# Patient Record
Sex: MALE | Race: WHITE | Employment: FULL TIME | URBAN - METROPOLITAN AREA
[De-identification: names, ages, dates, MRNs, and addresses within clinical notes are randomized per-mention and may not be internally consistent; named-entity substitution may affect disease eponyms.]

---

## 2018-02-11 ENCOUNTER — HOSPITAL ENCOUNTER (EMERGENCY)
Age: 53
Discharge: HOME | End: 2018-02-11
Attending: EMERGENCY MEDICINE | Admitting: EMERGENCY MEDICINE
Payer: COMMERCIAL

## 2018-02-11 VITALS
HEART RATE: 94 BPM | TEMPERATURE: 98.8 F | HEIGHT: 70 IN | WEIGHT: 165 LBS | RESPIRATION RATE: 12 BRPM | OXYGEN SATURATION: 100 % | DIASTOLIC BLOOD PRESSURE: 87 MMHG | SYSTOLIC BLOOD PRESSURE: 141 MMHG | BODY MASS INDEX: 23.62 KG/M2

## 2018-02-11 DIAGNOSIS — N34.2 URETHRITIS: Primary | ICD-10-CM

## 2018-02-11 PROCEDURE — 96372 THER/PROPH/DIAG INJ SC/IM: CPT

## 2018-02-11 PROCEDURE — 87491 CHLMYD TRACH DNA AMP PROBE: CPT | Performed by: EMERGENCY MEDICINE

## 2018-02-11 PROCEDURE — 74011000250 HC RX REV CODE- 250

## 2018-02-11 PROCEDURE — 74011250636 HC RX REV CODE- 250/636: Performed by: EMERGENCY MEDICINE

## 2018-02-11 PROCEDURE — 74011250637 HC RX REV CODE- 250/637: Performed by: EMERGENCY MEDICINE

## 2018-02-11 PROCEDURE — 99283 EMERGENCY DEPT VISIT LOW MDM: CPT

## 2018-02-11 RX ORDER — LIDOCAINE HYDROCHLORIDE 10 MG/ML
INJECTION, SOLUTION EPIDURAL; INFILTRATION; INTRACAUDAL; PERINEURAL
Status: COMPLETED
Start: 2018-02-11 | End: 2018-02-11

## 2018-02-11 RX ORDER — CEFTRIAXONE 250 MG/8ML
250 INJECTION, POWDER, FOR SOLUTION INTRAMUSCULAR; INTRAVENOUS
Status: COMPLETED | OUTPATIENT
Start: 2018-02-11 | End: 2018-02-11

## 2018-02-11 RX ORDER — AZITHROMYCIN 250 MG/1
1000 TABLET, FILM COATED ORAL
Status: COMPLETED | OUTPATIENT
Start: 2018-02-11 | End: 2018-02-11

## 2018-02-11 RX ADMIN — AZITHROMYCIN 1000 MG: 250 TABLET, FILM COATED ORAL at 10:07

## 2018-02-11 RX ADMIN — CEFTRIAXONE SODIUM 250 MG: 250 INJECTION, POWDER, FOR SOLUTION INTRAMUSCULAR; INTRAVENOUS at 10:09

## 2018-02-11 RX ADMIN — LIDOCAINE HYDROCHLORIDE 5 ML: 10 INJECTION, SOLUTION EPIDURAL; INFILTRATION; INTRACAUDAL; PERINEURAL at 10:09

## 2018-02-11 NOTE — ED TRIAGE NOTES
Reports pain w urination x4 days. Denies n/v/d. Pt worried about STD. Reports hx UTI. Denies rashes.

## 2018-02-11 NOTE — PROGRESS NOTES
I have reviewed discharge instructions with the patient. The patient verbalized understanding.   Pt ambulatory from ED in NAD

## 2018-02-11 NOTE — ED PROVIDER NOTES
EMERGENCY DEPARTMENT HISTORY AND PHYSICAL EXAM    9:46 AM      Date: 2/11/2018  Patient Name: Dom Talbot    History of Presenting Illness     Chief Complaint   Patient presents with    Urinary Pain         History Provided By: Patient    Chief Complaint: Urinary Pain  Duration: 4 Days  Timing:  Constant  Location:   Quality:   Severity: Moderate  Modifying Factors: Possible STD exposure  Associated Symptoms: denies penile lesions      Additional History (Context): Dom Talbot is a 45 y.o. male with hx of MASA syndrome and hernia repair who presents with c/o constant moderate urinary pain onset 4 days. Pt reports possible STD exposure. Denies penile lesions. PCP: None    Current Facility-Administered Medications   Medication Dose Route Frequency Provider Last Rate Last Dose    cefTRIAXone (ROCEPHIN) injection 250 mg  250 mg IntraMUSCular NOW Betzy Aguila MD        [facility]) tablet 1,000 mg  1,000 mg Oral NOW Betzy Aguila MD         Current Outpatient Prescriptions   Medication Sig Dispense Refill    doxycycline (VIBRAMYCIN) 100 mg capsule       tamsulosin (FLOMAX) 0.4 mg capsule Take 1 Cap by mouth daily (after dinner). 90 Cap 3    finasteride (PROSCAR) 5 mg tablet Take 1 Tab by mouth daily. 80 Tab 3       Past History     Past Medical History:  Past Medical History:   Diagnosis Date    MASA syndrome ([address]        Past Surgical History:  Past Surgical History:   Procedure Laterality Date    HX HERNIA REPAIR         Family History:  History reviewed. No pertinent family history. Social History:  Social History   Substance Use Topics    Smoking status: Current Every Day Smoker    Smokeless tobacco: Never Used      Comment: quit    Alcohol use Yes       Allergies: Allergies   Allergen Reactions    Penicillins Other (comments)         Review of Systems       Review of Systems   Constitutional: Negative. Negative for fever. HENT: Negative. Eyes: Negative. Respiratory: Negative. Cardiovascular: Negative. Gastrointestinal: Negative. Endocrine: Negative. Genitourinary: Positive for dysuria. Negative for genital sores. Musculoskeletal: Negative. Skin: Negative. Allergic/Immunologic: Negative. Neurological: Negative. Hematological: Negative. Psychiatric/Behavioral: Negative. All other systems reviewed and are negative. Physical Exam     Visit Vitals    /87 (BP Patient Position: At rest)    Pulse 94    Temp 98.8 °F (37.1 °C)    Resp 12    Ht 5' 10\" (1.778 m)    Wt 74.8 kg (165 lb)    SpO2 100%    BMI 23.68 kg/m2         Physical Exam   Constitutional: He is oriented to person, place, and time. He appears well-developed and well-nourished. No distress. HENT:   Head: Normocephalic. Mouth/Throat: Oropharynx is clear and moist.   Eyes: Conjunctivae and EOM are normal. Pupils are equal, round, and reactive to light. Neck: Normal range of motion. Neck supple. Cardiovascular: Normal rate, regular rhythm, normal heart sounds and intact distal pulses. No murmur heard. Pulmonary/Chest: Effort normal and breath sounds normal. No respiratory distress. He has no wheezes. He has no rales. He exhibits no tenderness. Abdominal: Soft. Bowel sounds are normal. He exhibits no distension. There is no tenderness. There is no rebound. Genitourinary: Penis normal.   Genitourinary Comments: No penile discharge. Testes - normal   Musculoskeletal: Normal range of motion. He exhibits no edema or tenderness. Neurological: He is alert and oriented to person, place, and time. No cranial nerve deficit. He exhibits normal muscle tone. Coordination normal.   Skin: Skin is warm and dry. No rash noted. Psychiatric: He has a normal mood and affect. His behavior is normal. Judgment and thought content normal.   Nursing note and vitals reviewed.         Diagnostic Study Results       Medical Decision Making   I am the first provider for this patient. I reviewed the vital signs, available nursing notes, past medical history, past surgical history, family history and social history. Vital Signs-Reviewed the patient's vital signs. Pulse Oximetry Analysis -  100% on room air (Interpretation) Normal    Cardiac Monitor:  Rate: 94 bpm  Rhythm:  Normal Sinus Rhythm       Records Reviewed: Nursing Notes (Time of Review: 9:45 AM)      Provider Notes (Medical Decision Making):     Diagnosis     Clinical Impression:   1. Urethritis        Disposition: Discharge    Follow-up Information     None           Patient's Medications   Start Taking    No medications on file   Continue Taking    DOXYCYCLINE (VIBRAMYCIN) 100 MG CAPSULE        FINASTERIDE (PROSCAR) 5 MG TABLET    Take 1 Tab by mouth daily. TAMSULOSIN (FLOMAX) 0.4 MG CAPSULE    Take 1 Cap by mouth daily (after dinner). These Medications have changed    No medications on file   Stop Taking    No medications on file     _______________________________    Attestations:  [id number] Mary[address] Snell acting as a scribe for and in the presence of Tacho Harvey MD      February 11, 2018 at 9:45 AM       Provider Attestation:      I personally performed the services described in the documentation, reviewed the documentation, as recorded by the scribe in my presence, and it accurately and completely records my words and actions.  February 11, 2018 at 9:45 AM - Tacho Harvey MD    _______________________________

## 2018-02-11 NOTE — DISCHARGE INSTRUCTIONS
Urethritis: Care Instructions  Your Care Instructions    Urethritis is an infection of the tube that takes urine from the bladder to the outside of the body. This tube is called the urethra. The infection is often caused by bacteria. This can happen if you have a sexually transmitted infection (STI). But a virus may also be a cause. Urethritis is usually treated with antibiotics. Most cases clear up with treatment. Proper treatment is very important. If you don't treat it, the infection can lead to lasting damage of the urethra. Other parts of the urinary system can also be damaged. Follow-up care is a key part of your treatment and safety. Be sure to make and go to all appointments, and call your doctor if you are having problems. It's also a good idea to know your test results and keep a list of the medicines you take. How can you care for yourself at home? · If your doctor prescribed antibiotics, take them as directed. Do not stop taking them just because you feel better. You need to take the full course of antibiotics. · Take an over-the-counter pain medicine, such as acetaminophen (Tylenol), ibuprofen (Advil, Motrin), or naproxen (Aleve), if needed. Be safe with medicines. Read and follow all instructions on the label. · Do not take two or more pain medicines at the same time unless the doctor told you to. Many pain medicines have acetaminophen, which is Tylenol. Too much acetaminophen (Tylenol) can be harmful. · Your doctor may have you take phenazopyridine (Pyridium). This is a pain medicine for the urinary tract. It can turn your urine a deep red-orange. This is normal. Call your doctor if you think you are having a problem with your medicine. · Do not have sex until you are done with treatment. If you do have sex, be sure to use a condom. Your sex partner or partners should be tested too if your urethritis was caused by an STI.   · If your infection was caused by an injury or chemicals, avoid those things if you can. When should you call for help? Call your doctor now or seek immediate medical care if:  ? · You can't urinate. ? · You have symptoms of a urinary infection. For example:  ¨ You have blood or pus in your urine. ¨ You have pain in your back just below your rib cage. This is called flank pain. ¨ You have a fever, chills, or body aches. ¨ It hurts to urinate. ¨ You have groin or belly pain. ? · You have a hard time urinating when your bladder is full. ? · You notice mental changes or feel confused. ? Watch closely for changes in your health, and be sure to contact your doctor if:  ? · You do not get better as expected. Where can you learn more? Go to http://nidhi-alan.info/. Enter K754 in the search box to learn more about \"Urethritis: Care Instructions. \"  Current as of: May 12, 2017  Content Version: 11.4  © 0015-6901 [employer]. Care instructions adapted under license by [employer] (which disclaims liability or warranty for this information). If you have questions about a medical condition or this instruction, always ask your healthcare professional. [address] any warranty or liability for your use of this information.

## 2018-02-12 LAB
C TRACH RRNA SPEC QL NAA+PROBE: NEGATIVE
N GONORRHOEA RRNA SPEC QL NAA+PROBE: NEGATIVE
SPECIMEN SOURCE: NORMAL

## 2018-04-19 ENCOUNTER — HOSPITAL ENCOUNTER (INPATIENT)
Age: 53
LOS: 1 days | Discharge: ANOTHER HOSPITAL | DRG: 872 | End: 2018-04-19
Attending: EMERGENCY MEDICINE | Admitting: INTERNAL MEDICINE
Payer: SELF-PAY

## 2018-04-19 ENCOUNTER — APPOINTMENT (OUTPATIENT)
Dept: CT IMAGING | Age: 53
DRG: 872 | End: 2018-04-19
Attending: EMERGENCY MEDICINE
Payer: SELF-PAY

## 2018-04-19 ENCOUNTER — APPOINTMENT (OUTPATIENT)
Dept: GENERAL RADIOLOGY | Age: 53
DRG: 872 | End: 2018-04-19
Attending: EMERGENCY MEDICINE
Payer: SELF-PAY

## 2018-04-19 VITALS
OXYGEN SATURATION: 98 % | WEIGHT: 160 LBS | HEART RATE: 88 BPM | TEMPERATURE: 98.3 F | RESPIRATION RATE: 17 BRPM | BODY MASS INDEX: 22.9 KG/M2 | HEIGHT: 70 IN | SYSTOLIC BLOOD PRESSURE: 120 MMHG | DIASTOLIC BLOOD PRESSURE: 67 MMHG

## 2018-04-19 DIAGNOSIS — F12.90 MARIJUANA SMOKER: ICD-10-CM

## 2018-04-19 DIAGNOSIS — E87.20 LACTIC ACID ACIDOSIS: ICD-10-CM

## 2018-04-19 DIAGNOSIS — F14.10 COCAINE ABUSE (HCC): ICD-10-CM

## 2018-04-19 DIAGNOSIS — R50.9 FEVER IN ADULT: ICD-10-CM

## 2018-04-19 DIAGNOSIS — A41.9 SEPSIS, DUE TO UNSPECIFIED ORGANISM: ICD-10-CM

## 2018-04-19 DIAGNOSIS — N39.0 URINARY TRACT INFECTION WITHOUT HEMATURIA, SITE UNSPECIFIED: Primary | ICD-10-CM

## 2018-04-19 DIAGNOSIS — F19.10 POLYSUBSTANCE ABUSE (HCC): ICD-10-CM

## 2018-04-19 PROBLEM — Z97.8 FOLEY CATHETER IN PLACE: Status: ACTIVE | Noted: 2018-04-19

## 2018-04-19 PROBLEM — R33.9 URINARY RETENTION: Status: ACTIVE | Noted: 2018-04-19

## 2018-04-19 LAB
ALBUMIN SERPL-MCNC: 4 G/DL (ref 3.4–5)
ALBUMIN/GLOB SERPL: 1 {RATIO} (ref 0.8–1.7)
ALP SERPL-CCNC: 83 U/L (ref 45–117)
ALT SERPL-CCNC: 29 U/L (ref 16–61)
AMPHET UR QL SCN: POSITIVE
ANION GAP SERPL CALC-SCNC: 10 MMOL/L (ref 3–18)
APPEARANCE UR: ABNORMAL
AST SERPL-CCNC: 22 U/L (ref 15–37)
ATRIAL RATE: 108 BPM
BACTERIA URNS QL MICRO: ABNORMAL /HPF
BARBITURATES UR QL SCN: NEGATIVE
BASOPHILS # BLD: 0 K/UL (ref 0–0.06)
BASOPHILS NFR BLD: 0 % (ref 0–2)
BENZODIAZ UR QL: POSITIVE
BILIRUB SERPL-MCNC: 1 MG/DL (ref 0.2–1)
BILIRUB UR QL: NEGATIVE
BUN SERPL-MCNC: 16 MG/DL (ref 7–18)
BUN/CREAT SERPL: 15 (ref 12–20)
CALCIUM SERPL-MCNC: 9 MG/DL (ref 8.5–10.1)
CALCULATED P AXIS, ECG09: 70 DEGREES
CALCULATED R AXIS, ECG10: 24 DEGREES
CALCULATED T AXIS, ECG11: 75 DEGREES
CANNABINOIDS UR QL SCN: POSITIVE
CHLORIDE SERPL-SCNC: 103 MMOL/L (ref 100–108)
CO2 SERPL-SCNC: 27 MMOL/L (ref 21–32)
COCAINE UR QL SCN: POSITIVE
COLOR UR: YELLOW
CREAT SERPL-MCNC: 1.07 MG/DL (ref 0.6–1.3)
DIAGNOSIS, 93000: NORMAL
DIFFERENTIAL METHOD BLD: ABNORMAL
EOSINOPHIL # BLD: 0.1 K/UL (ref 0–0.4)
EOSINOPHIL NFR BLD: 1 % (ref 0–5)
EPITH CASTS URNS QL MICRO: ABNORMAL /LPF (ref 0–5)
ERYTHROCYTE [DISTWIDTH] IN BLOOD BY AUTOMATED COUNT: 13.1 % (ref 11.6–14.5)
FLUAV AG NPH QL IA: NEGATIVE
FLUBV AG NOSE QL IA: NEGATIVE
GLOBULIN SER CALC-MCNC: 4.1 G/DL (ref 2–4)
GLUCOSE SERPL-MCNC: 101 MG/DL (ref 74–99)
GLUCOSE UR STRIP.AUTO-MCNC: NEGATIVE MG/DL
HCT VFR BLD AUTO: 47.6 % (ref 36–48)
HDSCOM,HDSCOM: ABNORMAL
HGB BLD-MCNC: 15.5 G/DL (ref 13–16)
HGB UR QL STRIP: NEGATIVE
KETONES UR QL STRIP.AUTO: NEGATIVE MG/DL
LACTATE BLD-SCNC: 1.6 MMOL/L (ref 0.4–2)
LACTATE BLD-SCNC: 3.3 MMOL/L (ref 0.4–2)
LACTATE BLD-SCNC: 3.4 MMOL/L (ref 0.4–2)
LEUKOCYTE ESTERASE UR QL STRIP.AUTO: ABNORMAL
LYMPHOCYTES # BLD: 0.6 K/UL (ref 0.9–3.6)
LYMPHOCYTES NFR BLD: 6 % (ref 21–52)
MCH RBC QN AUTO: 30.7 PG (ref 24–34)
MCHC RBC AUTO-ENTMCNC: 32.6 G/DL (ref 31–37)
MCV RBC AUTO: 94.3 FL (ref 74–97)
METHADONE UR QL: NEGATIVE
MONOCYTES # BLD: 0 K/UL (ref 0.05–1.2)
MONOCYTES NFR BLD: 0 % (ref 3–10)
NEUTS SEG # BLD: 9.3 K/UL (ref 1.8–8)
NEUTS SEG NFR BLD: 93 % (ref 40–73)
NITRITE UR QL STRIP.AUTO: POSITIVE
OPIATES UR QL: NEGATIVE
P-R INTERVAL, ECG05: 142 MS
PCP UR QL: NEGATIVE
PH UR STRIP: 6 [PH] (ref 5–8)
PLATELET # BLD AUTO: 276 K/UL (ref 135–420)
PMV BLD AUTO: 9.6 FL (ref 9.2–11.8)
POTASSIUM SERPL-SCNC: 3.9 MMOL/L (ref 3.5–5.5)
PROT SERPL-MCNC: 8.1 G/DL (ref 6.4–8.2)
PROT UR STRIP-MCNC: NEGATIVE MG/DL
Q-T INTERVAL, ECG07: 332 MS
QRS DURATION, ECG06: 86 MS
QTC CALCULATION (BEZET), ECG08: 444 MS
RBC # BLD AUTO: 5.05 M/UL (ref 4.7–5.5)
RBC #/AREA URNS HPF: ABNORMAL /HPF (ref 0–5)
SODIUM SERPL-SCNC: 140 MMOL/L (ref 136–145)
SP GR UR REFRACTOMETRY: 1.02 (ref 1–1.03)
UROBILINOGEN UR QL STRIP.AUTO: 0.2 EU/DL (ref 0.2–1)
VENTRICULAR RATE, ECG03: 108 BPM
WBC # BLD AUTO: 10 K/UL (ref 4.6–13.2)
WBC URNS QL MICRO: ABNORMAL /HPF (ref 0–4)

## 2018-04-19 PROCEDURE — 74176 CT ABD & PELVIS W/O CONTRAST: CPT

## 2018-04-19 PROCEDURE — 74011000250 HC RX REV CODE- 250: Performed by: EMERGENCY MEDICINE

## 2018-04-19 PROCEDURE — 71045 X-RAY EXAM CHEST 1 VIEW: CPT

## 2018-04-19 PROCEDURE — 85025 COMPLETE CBC W/AUTO DIFF WBC: CPT | Performed by: EMERGENCY MEDICINE

## 2018-04-19 PROCEDURE — 80307 DRUG TEST PRSMV CHEM ANLYZR: CPT | Performed by: EMERGENCY MEDICINE

## 2018-04-19 PROCEDURE — 74011250637 HC RX REV CODE- 250/637: Performed by: EMERGENCY MEDICINE

## 2018-04-19 PROCEDURE — 93005 ELECTROCARDIOGRAM TRACING: CPT

## 2018-04-19 PROCEDURE — 96365 THER/PROPH/DIAG IV INF INIT: CPT

## 2018-04-19 PROCEDURE — 80053 COMPREHEN METABOLIC PANEL: CPT | Performed by: EMERGENCY MEDICINE

## 2018-04-19 PROCEDURE — 87804 INFLUENZA ASSAY W/OPTIC: CPT | Performed by: EMERGENCY MEDICINE

## 2018-04-19 PROCEDURE — 99285 EMERGENCY DEPT VISIT HI MDM: CPT

## 2018-04-19 PROCEDURE — 81001 URINALYSIS AUTO W/SCOPE: CPT | Performed by: EMERGENCY MEDICINE

## 2018-04-19 PROCEDURE — 87186 SC STD MICRODIL/AGAR DIL: CPT | Performed by: EMERGENCY MEDICINE

## 2018-04-19 PROCEDURE — 74011250636 HC RX REV CODE- 250/636: Performed by: EMERGENCY MEDICINE

## 2018-04-19 PROCEDURE — 96375 TX/PRO/DX INJ NEW DRUG ADDON: CPT

## 2018-04-19 PROCEDURE — 87077 CULTURE AEROBIC IDENTIFY: CPT | Performed by: EMERGENCY MEDICINE

## 2018-04-19 PROCEDURE — 65270000029 HC RM PRIVATE

## 2018-04-19 PROCEDURE — 87040 BLOOD CULTURE FOR BACTERIA: CPT | Performed by: EMERGENCY MEDICINE

## 2018-04-19 PROCEDURE — 83605 ASSAY OF LACTIC ACID: CPT

## 2018-04-19 PROCEDURE — 87491 CHLMYD TRACH DNA AMP PROBE: CPT | Performed by: EMERGENCY MEDICINE

## 2018-04-19 PROCEDURE — 87086 URINE CULTURE/COLONY COUNT: CPT | Performed by: EMERGENCY MEDICINE

## 2018-04-19 PROCEDURE — 96361 HYDRATE IV INFUSION ADD-ON: CPT

## 2018-04-19 RX ORDER — ACETAMINOPHEN 325 MG/1
975 TABLET ORAL ONCE
Status: COMPLETED | OUTPATIENT
Start: 2018-04-19 | End: 2018-04-19

## 2018-04-19 RX ORDER — SODIUM CHLORIDE 0.9 % (FLUSH) 0.9 %
5-10 SYRINGE (ML) INJECTION AS NEEDED
Status: DISCONTINUED | OUTPATIENT
Start: 2018-04-19 | End: 2018-04-19 | Stop reason: HOSPADM

## 2018-04-19 RX ORDER — SODIUM CHLORIDE 9 MG/ML
150 INJECTION, SOLUTION INTRAVENOUS CONTINUOUS
Status: DISCONTINUED | OUTPATIENT
Start: 2018-04-19 | End: 2018-04-19 | Stop reason: HOSPADM

## 2018-04-19 RX ORDER — KETOROLAC TROMETHAMINE 30 MG/ML
30 INJECTION, SOLUTION INTRAMUSCULAR; INTRAVENOUS
Status: COMPLETED | OUTPATIENT
Start: 2018-04-19 | End: 2018-04-19

## 2018-04-19 RX ORDER — LEVOFLOXACIN 5 MG/ML
750 INJECTION, SOLUTION INTRAVENOUS
Status: COMPLETED | OUTPATIENT
Start: 2018-04-19 | End: 2018-04-19

## 2018-04-19 RX ORDER — ONDANSETRON 2 MG/ML
4 INJECTION INTRAMUSCULAR; INTRAVENOUS
Status: COMPLETED | OUTPATIENT
Start: 2018-04-19 | End: 2018-04-19

## 2018-04-19 RX ADMIN — CEFEPIME HYDROCHLORIDE 2 G: 2 INJECTION, POWDER, FOR SOLUTION INTRAVENOUS at 14:14

## 2018-04-19 RX ADMIN — LEVOFLOXACIN 750 MG: 5 INJECTION, SOLUTION INTRAVENOUS at 13:18

## 2018-04-19 RX ADMIN — SODIUM CHLORIDE 150 ML/HR: 900 INJECTION, SOLUTION INTRAVENOUS at 18:39

## 2018-04-19 RX ADMIN — SODIUM CHLORIDE 2178 ML: 900 INJECTION, SOLUTION INTRAVENOUS at 12:44

## 2018-04-19 RX ADMIN — ONDANSETRON 4 MG: 2 INJECTION, SOLUTION INTRAMUSCULAR; INTRAVENOUS at 12:42

## 2018-04-19 RX ADMIN — KETOROLAC TROMETHAMINE 30 MG: 30 INJECTION, SOLUTION INTRAMUSCULAR at 13:21

## 2018-04-19 RX ADMIN — ACETAMINOPHEN 975 MG: 325 TABLET ORAL at 12:41

## 2018-04-19 NOTE — ED NOTES
LDA removed in [facility] for documentation purposes only. Patient admitted to hospital with; site 1- Peripheral IV, which at time of admission is Clean, Dry, and intact, no signs or symptoms of phlebitis. No signs or symptoms of infiltration and Patent. Site 2- Peripheral IV, which at time of admission is Clean, Dry, and intact, no signs or symptoms of phlebitis. No signs or symptoms of infiltration and Patent. And site 3- Urinary Catheter, which at time of admission is Clean, Dry, and intact, Draining.

## 2018-04-19 NOTE — ED PROVIDER NOTES
EMERGENCY DEPARTMENT HISTORY AND PHYSICAL EXAM    12:27 PM      Date: 4/19/2018  Patient Name: Carloz Monae    History of Presenting Illness     Chief Complaint   Patient presents with    Pelvic Pain    Blood in Urine    Chills         History Provided By: Patient    Chief Complaint: Urinary Retention   Duration: 2 Months  Timing:  Constant  Location: penile area   Severity: Moderate  Modifying Factors: On Flomax, out of medication   Associated Symptoms: hematuria, nausea, cough, body aches       Additional History (Context): Carloz Monae is a 45 y.o. male with PMHx of MASA syndrome who presents c/o constant moderate urinary retention onset for the past x 2 months. Pt has not been able to self-cath himself and currently has a pettit in place. He is followed by Dr. Mendoza Monroe (Urologist) and went for a routine visit today when he developed associated Sx of hematuria, nausea, cough, and body aches (onset this morning). Pt was on Flomax and admits running out of the medication. No sickly contacts. Pt admits to snorting cocaine last night, however, denies IV drug use. Pt is also using Adderall. Tobacco and EtOH use. He also has a hx of past Staph infection in his leg. Denies any further complaints or symptoms at the moment. PCP: None      Past History     Past Medical History:  Past Medical History:   Diagnosis Date    MASA syndrome ([address]        Past Surgical History:  Past Surgical History:   Procedure Laterality Date    HX HERNIA REPAIR         Family History:  History reviewed. No pertinent family history. Social History:  Social History   Substance Use Topics    Smoking status: Current Every Day Smoker    Smokeless tobacco: Never Used      Comment: quit    Alcohol use Yes       Allergies: Allergies   Allergen Reactions    Penicillins Other (comments)         Review of Systems       Review of Systems   Constitutional: Negative for fever. HENT: Negative for sore throat.     Eyes: Negative for redness and visual disturbance. Respiratory: Positive for cough. Negative for shortness of breath and wheezing. Cardiovascular: Negative for chest pain. Gastrointestinal: Positive for nausea. Negative for abdominal pain. Endocrine: Negative for polyuria. Genitourinary: Positive for difficulty urinating and hematuria. Negative for dysuria. Musculoskeletal: Positive for myalgias. Negative for arthralgias and neck stiffness. Skin: Negative for rash. Neurological: Negative for headaches. All other systems reviewed and are negative. Physical Exam     Visit Vitals    /86 (BP 1 Location: Left arm, BP Patient Position: Supine; Head of bed elevated (Comment degrees))    Pulse (!) 103    Temp (!) 101.3 °F (38.5 °C)    Resp 18    Ht 5' 10\" (1.778 m)    Wt 72.6 kg (160 lb)    SpO2 97%    BMI 22.96 kg/m2         Physical Exam   Constitutional: He is oriented to person, place, and time. He appears well-developed and well-nourished. No distress. HENT:   Head: Normocephalic and atraumatic. Mouth/Throat: Oropharynx is clear and moist.   Eyes: Conjunctivae are normal. Pupils are equal, round, and reactive to light. No scleral icterus. Neck: Normal range of motion. Neck supple. Cardiovascular: Intact distal pulses. Tachycardia present. Capillary refill < 4 seconds  Symmetric DP pulse    Pulmonary/Chest: Effort normal and breath sounds normal. Tachypnea noted. No respiratory distress. He has no wheezes. Abdominal: Soft. Bowel sounds are normal. He exhibits no distension. There is no tenderness. Musculoskeletal: Normal range of motion. He exhibits no edema. Lymphadenopathy:     He has no cervical adenopathy. Neurological: He is alert and oriented to person, place, and time. No cranial nerve deficit. Skin: Skin is warm and dry. He is not diaphoretic. Nursing note and vitals reviewed.         Diagnostic Study Results     Labs -  Recent Results (from the past 12 hour(s))   AMB POC URINALYSIS DIP STICK AUTO W/O MICRO    Collection Time: 04/19/18 10:49 AM   Result Value Ref Range    Color (UA POC) Yellow     Clarity (UA POC) Cloudy     Glucose (UA POC) Negative Negative    Bilirubin (UA POC) Negative Negative    Ketones (UA POC) Negative Negative    Specific gravity (UA POC) 1.025 1.001 - 1.035    Blood (UA POC) 4+ Negative    pH (UA POC) 6.0 4.6 - 8.0    Protein (UA POC) 3+ Negative    Urobilinogen (UA POC) 0.2 mg/dL 0.2 - 1    Nitrites (UA POC) Positive Negative    Leukocyte esterase (UA POC) 1+ Negative   EKG, 12 LEAD, INITIAL    Collection Time: 04/19/18 12:23 PM   Result Value Ref Range    Ventricular Rate 108 BPM    Atrial Rate 108 BPM    P-R Interval 142 ms    QRS Duration 86 ms    Q-T Interval 332 ms    QTC Calculation (Bezet) 444 ms    Calculated P Axis 70 degrees    Calculated R Axis 24 degrees    Calculated T Axis 75 degrees    Diagnosis       Sinus tachycardia  Cannot rule out Anterior infarct , age undetermined  Abnormal ECG  No previous ECGs available  Confirmed by Twila Swartz MD, --- ([id number]) on 4/19/2018 4:06:04 PM     CBC WITH AUTOMATED DIFF    Collection Time: 04/19/18 12:32 PM   Result Value Ref Range    WBC 10.0 4.6 - 13.2 K/uL    RBC 5.05 4.70 - 5.50 M/uL    HGB 15.5 13.0 - 16.0 g/dL    HCT 47.6 36.0 - 48.0 %    MCV 94.3 74.0 - 97.0 FL    MCH 30.7 24.0 - 34.0 PG    MCHC 32.6 31.0 - 37.0 g/dL    RDW 13.1 11.6 - 14.5 %    PLATELET 572 896 - 983 K/uL    MPV 9.6 9.2 - 11.8 FL    NEUTROPHILS 93 (H) 40 - 73 %    LYMPHOCYTES 6 (L) 21 - 52 %    MONOCYTES 0 (L) 3 - 10 %    EOSINOPHILS 1 0 - 5 %    BASOPHILS 0 0 - 2 %    ABS. NEUTROPHILS 9.3 (H) 1.8 - 8.0 K/UL    ABS. LYMPHOCYTES 0.6 (L) 0.9 - 3.6 K/UL    ABS. MONOCYTES 0.0 (L) 0.05 - 1.2 K/UL    ABS. EOSINOPHILS 0.1 0.0 - 0.4 K/UL    ABS.  BASOPHILS 0.0 0.0 - 0.06 K/UL    DF AUTOMATED     METABOLIC PANEL, COMPREHENSIVE    Collection Time: 04/19/18 12:32 PM   Result Value Ref Range    Sodium 140 136 - 145 mmol/L    Potassium 3.9 3.5 - 5.5 mmol/L    Chloride 103 100 - 108 mmol/L    CO2 27 21 - 32 mmol/L    Anion gap 10 3.0 - 18 mmol/L    Glucose 101 (H) 74 - 99 mg/dL    BUN 16 7.0 - 18 MG/DL    Creatinine 1.07 0.6 - 1.3 MG/DL    BUN/Creatinine ratio 15 12 - 20      GFR est AA >60 >60 ml/min/1.73m2    GFR est non-AA >60 >60 ml/min/1.73m2    Calcium 9.0 8.5 - 10.1 MG/DL    Bilirubin, total 1.0 0.2 - 1.0 MG/DL    ALT (SGPT) 29 16 - 61 U/L    AST (SGOT) 22 15 - 37 U/L    Alk.  phosphatase 83 45 - 117 U/L    Protein, total 8.1 6.4 - 8.2 g/dL    Albumin 4.0 3.4 - 5.0 g/dL    Globulin 4.1 (H) 2.0 - 4.0 g/dL    A-G Ratio 1.0 0.8 - 1.7     POC LACTIC ACID    Collection Time: 04/19/18 12:36 PM   Result Value Ref Range    Lactic Acid (POC) 3.4 (HH) 0.4 - 2.0 mmol/L   URINALYSIS W/ RFLX MICROSCOPIC    Collection Time: 04/19/18 12:50 PM   Result Value Ref Range    Color YELLOW      Appearance CLOUDY      Specific gravity 1.019 1.005 - 1.030      pH (UA) 6.0 5.0 - 8.0      Protein NEGATIVE  NEG mg/dL    Glucose NEGATIVE  NEG mg/dL    Ketone NEGATIVE  NEG mg/dL    Bilirubin NEGATIVE  NEG      Blood NEGATIVE  NEG      Urobilinogen 0.2 0.2 - 1.0 EU/dL    Nitrites POSITIVE (A) NEG      Leukocyte Esterase SMALL (A) NEG     DRUG SCREEN, URINE    Collection Time: 04/19/18 12:50 PM   Result Value Ref Range    BENZODIAZEPINES POSITIVE (A) NEG      BARBITURATES NEGATIVE  NEG      THC (TH-CANNABINOL) POSITIVE (A) NEG      OPIATES NEGATIVE  NEG      PCP(PHENCYCLIDINE) NEGATIVE  NEG      COCAINE POSITIVE (A) NEG      AMPHETAMINES POSITIVE (A) NEG      METHADONE NEGATIVE  NEG      HDSCOM (NOTE)    URINE MICROSCOPIC ONLY    Collection Time: 04/19/18 12:50 PM   Result Value Ref Range    WBC 11 to 20 0 - 4 /hpf    RBC NONE 0 - 5 /hpf    Epithelial cells FEW 0 - 5 /lpf    Bacteria 4+ (A) NEG /hpf   INFLUENZA A & B AG (RAPID TEST)    Collection Time: 04/19/18  1:00 PM   Result Value Ref Range    Influenza A Antigen NEGATIVE  NEG      Influenza B Antigen NEGATIVE  NEG     POC LACTIC ACID    Collection Time: 04/19/18  3:39 PM   Result Value Ref Range    Lactic Acid (POC) 3.3 (HH) 0.4 - 2.0 mmol/L   POC LACTIC ACID    Collection Time: 04/19/18  6:30 PM   Result Value Ref Range    Lactic Acid (POC) 1.6 0.4 - 2.0 mmol/L       Radiologic Studies -   XR CHEST PORT   Final Result      CT ABD PELV WO CONT   IMPRESSIONS:      There is no evidence of renal or ureteric calculus in either side. No evidence  of hydronephrosis. No evidence of any perinephric inflammatory change or  obstructive change.     Bladder is moderately prominent in size. Bladder wall appears to be mild to  moderately irregularly thickened with probable trabeculation without obvious  mass.     Prostate is only mildly prominent in size. Zaragoza catheter in bladder.     Normal small appendix at the posterior inferior aspect of cecum.     Moderate to large amount of stool scattered in colon. No evidence of  diverticulosis coli.     Additional negative findings or benign findings as in body of report. CXR   IMPRESSION:  No acute findings    Medical Decision Making   I am the first provider for this patient. I reviewed the vital signs, available nursing notes, past medical history, past surgical history, family history and social history. Vital Signs-Reviewed the patient's vital signs. Pulse Oximetry Analysis - 97 % on RA, normal     EKG: Interpreted by the EP.    Time Interpreted: 1226   Rate: 108   Rhythm: Sinus Tachycardia    Interpretation: normal QRS duration, normal axis, normal QTc, no LAURA and no ST            depressions; has some artifact    Records Reviewed: Nursing Notes (Time of Review: 12:27 PM)    Provider Notes (Medical Decision Making): ddx UTI, urinary retention with indwelling catheter, kidney stone, metabolic, dehydration, other infectious, drug abuse    Pt with fever and rigors, came from his urologists office where he was having rigors, has known uti    Code sepsis  IVF 30cc/kg, abx IV, tylenol  Labs    MDM    Medications   sodium chloride (NS) flush 5-10 mL (not administered)   0.9% sodium chloride infusion (150 mL/hr IntraVENous New Bag 4/19/18 6839)   sodium chloride 0.9 % bolus infusion 2,178 mL (0 mL/kg × 72.6 kg IntraVENous IV Completed 4/19/18 1345)   acetaminophen (TYLENOL) tablet 975 mg (975 mg Oral Given 4/19/18 1241)   ondansetron (ZOFRAN) injection 4 mg (4 mg IntraVENous Given 4/19/18 1242)   levoFLOXacin (LEVAQUIN) 750 mg in D5W IVPB (0 mg IntraVENous IV Completed 4/19/18 1445)   ketorolac (TORADOL) injection 30 mg (30 mg IntraVENous Given 4/19/18 1321)   cefepime (MAXIPIME) 2 g in sterile water (preservative free) 10 mL IV syringe (2 g IntraVENous Given 4/19/18 1414)         ED Course: Progress Notes, Reevaluation, and Consults:    (-) influenza  LA 3.4  WBC wnl    Has sepsis from uti  Will admit  Has gotten cefepime and levaquin IVPB  Pt agrees with admission    2:20 PM Consult: I discussed care with Dr. Martell Quiñones (Hospitalist). It was a standard discussion including patient history, chief complaint, available diagnostic results, and predicted treatment course. Agrees to accept pt.     3:55 PM Progress Note: Patient is stating that he does not want to be admitted to [facility] and is requesting to be admitted to [facility]. I told him that there has to be a bed available and an accepting doctor and that I will try my best to get him admitted to [facility].     4:37 PM Consult: Discussed care with Dr. Tapan Buitrago (Hospitalist). Standard discussion; including history of patients chief complaint, available diagnostic results, and treatment course. I attempted to transfer the patient to [facility]. The hospitalist at [facility] is unwilling to accept the patient because the pt can be treated and admitted to [place]. 4:52 PM Progress Note: Patient states if another bed cannot be found somewhere other than [place] [facility] he would like to sign out AMA.  I have pleaded with the pt to stay and allow to admittance, the pt was told the risk and benefits including severe sepsis worsening and death versus being admitted and treated. The pt fully understands. Sepsis 3-6 hour reevaluation and exam:       Reevaluation:    Vital Signs:   Patient Vitals for the past 12 hrs:   Temp Pulse Resp BP SpO2   04/19/18 1829 98.3 °F (36.8 °C) - - - -   04/19/18 1826 - 88 17 - 98 %   04/19/18 1820 - 94 22 120/67 -   04/19/18 1730 - 92 16 101/65 98 %   04/19/18 1700 - 95 16 104/67 97 %   04/19/18 1630 - (!) 105 21 115/66 99 %   04/19/18 1625 - (!) 102 14 - 97 %   04/19/18 1455 - (!) 114 20 - 96 %   04/19/18 1430 - (!) 119 26 102/63 99 %   04/19/18 1400 - (!) 113 19 97/49 -   04/19/18 1330 - (!) 107 (!) 32 112/57 98 %   04/19/18 1213 (!) 101.3 °F (38.5 °C) (!) 103 18 150/86 97 %       Cardiopulmonary assessment:  RESP: Chest clear, equal breath sounds. CV: S1 and S2 WNL; No murmurs, gallops or rubs. Capillary refill:  <3 seconds  Peripheral pulse: Symmetric dorsalis pedal pulses    Skin exam:  Skin color: pink  Skin Turgor: good    Sepsis 3-6 hour reevaluation and exam performed at 5:22PM    Critical care time:   I have spent 45 minutes of critical care time involved in lab review, consultations with specialist, family decision making, and documentation. During this entire length of time I was immediately available to the patient. Critical care: The reason for providing this level of medical care for this critically ill patient was due to a critical illness that impaired one or more vital organ systems such that there was a high probability of imminent or life threatening deterioration in the patients condition. This care involved high complexity decision making to assess, manipulate and support vital system functions, to treat this degree vital organ system failure and to prevent further life threatening deterioration of the patient's condition. 5:41 PM Consult: I discussed care with Dr. Carl Young ([facility]).   It was a standard discussion including patient history, chief complaint, available diagnostic results, and predicted treatment course. States that there are only med surg tele beds available and is unwilling to accept unless a step down bed is available. He will accept pt to a Stepdown bed. Will call back if a step down bed becomes available. 6:01 PM Progress Note: Patient request Gonorrhea and Chlamydia test.     6:07 PM Progress Note: Transfer center called back and [facility] has a bed for the patient and he will be admitted there. Pt very pleased with transfer. Diagnosis     Clinical Impression:   1. Urinary tract infection without hematuria, site unspecified    2. Sepsis, due to unspecified organism ([address]    3. Lactic acid acidosis    4. Fever in adult    5. Cocaine abuse    6. Marijuana smoker    7. Polysubstance abuse        Disposition: transferred    Follow-up Information     None           Patient's Medications   Start Taking    No medications on file   Continue Taking    CIPROFLOXACIN HCL (CIPRO) 500 MG TABLET    Take 1 Tab by mouth two (2) times a day. FINASTERIDE (PROSCAR) 5 MG TABLET    Take 1 Tab by mouth daily. TAMSULOSIN (FLOMAX) 0.4 MG CAPSULE    Take 2 Caps by mouth daily (after dinner) for 360 days. These Medications have changed    No medications on file   Stop Taking    DOXYCYCLINE (VIBRAMYCIN) 100 MG CAPSULE        LISINOPRIL (PRINIVIL, ZESTRIL) 10 MG TABLET    TK 1 T PO  QAM     _______________________________    Attestations:  Scribe Attestation     Sydni Bender acting as a scribe for and in the presence of Yolanda Beth DO      April 19, 2018 at 12:27 PM       Provider Attestation:      I personally performed the services described in the documentation, reviewed the documentation, as recorded by the scribe in my presence, and it accurately and completely records my words and actions.  April 19, 2018 at 12:27 PM - Yolanda Beth DO    _______________________________

## 2018-04-19 NOTE — ED TRIAGE NOTES
Pt presents via ems for pelvic pain, blood in urine and chills. Pt states has not been able to pee x 2 days when self cathed today had blood in urine. At urology md office today developed fever/chills, nausea and shortness of breath.

## 2018-04-19 NOTE — ED NOTES
Pt states does not want to be admitted to [facility], states \"anywhere but there. \" Dr. Browning Blind notified.

## 2018-04-20 NOTE — ED NOTES
Blood culture  Results called to  [facility] made aware results in care everywhere

## 2018-04-22 LAB
BACTERIA SPEC CULT: ABNORMAL
GRAM STN SPEC: ABNORMAL
GRAM STN SPEC: ABNORMAL
SERVICE CMNT-IMP: ABNORMAL

## 2018-04-23 LAB
BACTERIA SPEC CULT: ABNORMAL
BACTERIA SPEC CULT: ABNORMAL
C TRACH RRNA SPEC QL NAA+PROBE: NEGATIVE
N GONORRHOEA RRNA SPEC QL NAA+PROBE: NEGATIVE
SERVICE CMNT-IMP: ABNORMAL
SPECIMEN SOURCE: NORMAL

## 2018-04-25 LAB
BACTERIA SPEC CULT: NORMAL
SERVICE CMNT-IMP: NORMAL

## 2021-12-19 ENCOUNTER — APPOINTMENT (OUTPATIENT)
Dept: CT IMAGING | Age: 56
End: 2021-12-19
Attending: EMERGENCY MEDICINE
Payer: COMMERCIAL

## 2021-12-19 ENCOUNTER — HOSPITAL ENCOUNTER (EMERGENCY)
Age: 56
Discharge: LEFT BEFORE DISCHARGE | End: 2021-12-19
Attending: EMERGENCY MEDICINE
Payer: COMMERCIAL

## 2021-12-19 VITALS
DIASTOLIC BLOOD PRESSURE: 90 MMHG | RESPIRATION RATE: 16 BRPM | HEIGHT: 70 IN | HEART RATE: 110 BPM | WEIGHT: 170 LBS | SYSTOLIC BLOOD PRESSURE: 160 MMHG | BODY MASS INDEX: 24.34 KG/M2 | OXYGEN SATURATION: 98 % | TEMPERATURE: 98.7 F

## 2021-12-19 DIAGNOSIS — F29 PSYCHOSIS, UNSPECIFIED PSYCHOSIS TYPE (HCC): ICD-10-CM

## 2021-12-19 DIAGNOSIS — R07.9 CHEST PAIN, UNSPECIFIED TYPE: Primary | ICD-10-CM

## 2021-12-19 LAB
ANION GAP SERPL CALC-SCNC: 14 MMOL/L
APAP SERPL-MCNC: <10 UG/ML (ref 10–30)
BASOPHILS # BLD: 0 K/UL (ref 0–0.1)
BASOPHILS NFR BLD: 0 % (ref 0–2)
BUN SERPL-MCNC: 25 MG/DL (ref 9–21)
BUN/CREAT SERPL: 17
CA-I BLD-MCNC: 9.3 MG/DL (ref 8.5–10.5)
CHLORIDE SERPL-SCNC: 98 MMOL/L (ref 94–111)
CO2 SERPL-SCNC: 27 MMOL/L (ref 21–33)
CREAT SERPL-MCNC: 1.5 MG/DL (ref 0.8–1.5)
D DIMER PPP FEU-MCNC: 0.74 UG/ML(FEU)
DATE LAST DOSE: ABNORMAL
DATE LAST DOSE: ABNORMAL
DIFFERENTIAL METHOD BLD: ABNORMAL
EOSINOPHIL # BLD: 0 K/UL (ref 0–0.4)
EOSINOPHIL NFR BLD: 0 % (ref 0–5)
ERYTHROCYTE [DISTWIDTH] IN BLOOD BY AUTOMATED COUNT: 13 % (ref 11.6–14.5)
ETHANOL PERCENT, ALCP: <0.004 %
ETHANOL SERPL-MCNC: <4 MG/DL
GLUCOSE SERPL-MCNC: 93 MG/DL (ref 70–110)
HCT VFR BLD AUTO: 43 % (ref 36–48)
HGB BLD-MCNC: 14.4 G/DL (ref 13–16)
IMM GRANULOCYTES # BLD AUTO: 0.1 K/UL (ref 0–0.04)
IMM GRANULOCYTES NFR BLD AUTO: 0 % (ref 0–0.5)
LYMPHOCYTES # BLD: 0.9 K/UL (ref 0.9–3.6)
LYMPHOCYTES NFR BLD: 7 % (ref 21–52)
MAGNESIUM SERPL-MCNC: 2.3 MG/DL (ref 1.7–2.8)
MCH RBC QN AUTO: 31.5 PG (ref 24–34)
MCHC RBC AUTO-ENTMCNC: 33.5 G/DL (ref 31–37)
MCV RBC AUTO: 94.1 FL (ref 78–100)
MONOCYTES # BLD: 0.9 K/UL (ref 0.05–1.2)
MONOCYTES NFR BLD: 6 % (ref 3–10)
NEUTS SEG # BLD: 11.6 K/UL (ref 1.8–8)
NEUTS SEG NFR BLD: 87 % (ref 40–73)
NRBC # BLD: 0 K/UL (ref 0–0.01)
NRBC BLD-RTO: 0 PER 100 WBC
PLATELET # BLD AUTO: 251 K/UL (ref 135–420)
PMV BLD AUTO: 9.7 FL (ref 9.2–11.8)
POTASSIUM SERPL-SCNC: 3.4 MMOL/L (ref 3.2–5.1)
RBC # BLD AUTO: 4.57 M/UL (ref 4.35–5.65)
REPORTED DOSE,DOSE: ABNORMAL UNITS
REPORTED DOSE,DOSE: ABNORMAL UNITS
SALICYLATES SERPL-MCNC: <4 MG/DL (ref 4–30)
SODIUM SERPL-SCNC: 139 MMOL/L (ref 135–145)
TROPONIN I SERPL-MCNC: 0.03 NG/ML (ref 0.02–0.05)
WBC # BLD AUTO: 13.5 K/UL (ref 4.6–13.2)

## 2021-12-19 PROCEDURE — 93005 ELECTROCARDIOGRAM TRACING: CPT

## 2021-12-19 PROCEDURE — 99285 EMERGENCY DEPT VISIT HI MDM: CPT

## 2021-12-19 PROCEDURE — 85025 COMPLETE CBC W/AUTO DIFF WBC: CPT

## 2021-12-19 PROCEDURE — 83735 ASSAY OF MAGNESIUM: CPT

## 2021-12-19 PROCEDURE — 80143 DRUG ASSAY ACETAMINOPHEN: CPT

## 2021-12-19 PROCEDURE — 80048 BASIC METABOLIC PNL TOTAL CA: CPT

## 2021-12-19 PROCEDURE — 84484 ASSAY OF TROPONIN QUANT: CPT

## 2021-12-19 PROCEDURE — 74011250636 HC RX REV CODE- 250/636: Performed by: EMERGENCY MEDICINE

## 2021-12-19 PROCEDURE — 82077 ASSAY SPEC XCP UR&BREATH IA: CPT

## 2021-12-19 PROCEDURE — 74011250637 HC RX REV CODE- 250/637: Performed by: EMERGENCY MEDICINE

## 2021-12-19 PROCEDURE — 85379 FIBRIN DEGRADATION QUANT: CPT

## 2021-12-19 PROCEDURE — 36415 COLL VENOUS BLD VENIPUNCTURE: CPT

## 2021-12-19 PROCEDURE — 80179 DRUG ASSAY SALICYLATE: CPT

## 2021-12-19 RX ORDER — SODIUM CHLORIDE 9 MG/ML
1000 INJECTION, SOLUTION INTRAVENOUS ONCE
Status: COMPLETED | OUTPATIENT
Start: 2021-12-19 | End: 2021-12-19

## 2021-12-19 RX ADMIN — NITROGLYCERIN 0.5 INCH: 20 OINTMENT TOPICAL at 21:00

## 2021-12-19 RX ADMIN — SODIUM CHLORIDE 1000 ML: 9 INJECTION, SOLUTION INTRAVENOUS at 20:59

## 2021-12-20 LAB
ATRIAL RATE: 109 BPM
CALCULATED P AXIS, ECG09: 82 DEGREES
CALCULATED R AXIS, ECG10: 40 DEGREES
CALCULATED T AXIS, ECG11: 60 DEGREES
DIAGNOSIS, 93000: NORMAL
P-R INTERVAL, ECG05: 161 MS
Q-T INTERVAL, ECG07: 356 MS
QRS DURATION, ECG06: 97 MS
QTC CALCULATION (BEZET), ECG08: 480 MS
VENTRICULAR RATE, ECG03: 109 BPM
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
## 2021-12-20 NOTE — ED NOTES
Patient walked out of ED and is standing in ambulance bay with hospital security. Patient yelling about how he was lied to and \"this is not [facility]\". Patient demanding to speak to his brother Edson Zabala. Nursing supervisor called brother and asked him to come to hospital. Patient on 's phone with brother at this time. [facility] PD called. TDO paperwork faxed to .

## 2022-03-18 PROBLEM — E87.20 LACTIC ACID ACIDOSIS: Status: ACTIVE | Noted: 2018-04-19

## 2022-03-19 PROBLEM — Z97.8 FOLEY CATHETER IN PLACE: Status: ACTIVE | Noted: 2018-04-19

## 2022-03-19 PROBLEM — A41.9 SEPSIS (HCC): Status: ACTIVE | Noted: 2018-04-19

## 2022-03-19 PROBLEM — F19.10 POLYSUBSTANCE ABUSE (HCC): Status: ACTIVE | Noted: 2018-04-19

## 2022-03-20 PROBLEM — R33.9 URINARY RETENTION: Status: ACTIVE | Noted: 2018-04-19

## 2022-03-20 PROBLEM — N39.0 UTI (URINARY TRACT INFECTION): Status: ACTIVE | Noted: 2018-04-19

## 2022-10-04 ENCOUNTER — HOSPITAL ENCOUNTER (EMERGENCY)
Age: 57
Discharge: HOME | End: 2022-10-04
Attending: EMERGENCY MEDICINE
Payer: COMMERCIAL

## 2022-10-04 VITALS
HEIGHT: 69 IN | SYSTOLIC BLOOD PRESSURE: 132 MMHG | TEMPERATURE: 97.9 F | BODY MASS INDEX: 24.44 KG/M2 | OXYGEN SATURATION: 100 % | DIASTOLIC BLOOD PRESSURE: 89 MMHG | WEIGHT: 165 LBS | HEART RATE: 94 BPM | RESPIRATION RATE: 17 BRPM

## 2022-10-04 DIAGNOSIS — R30.0 DYSURIA: ICD-10-CM

## 2022-10-04 DIAGNOSIS — N39.0 ACUTE UTI: Primary | ICD-10-CM

## 2022-10-04 LAB
APPEARANCE UR: CLEAR
BILIRUB UR QL: NEGATIVE
C TRACH RRNA SPEC QL NAA+PROBE: NEGATIVE
COLOR UR: YELLOW
EPITH CASTS URNS QL MICRO: NORMAL /LPF (ref 0–5)
GLUCOSE UR STRIP.AUTO-MCNC: NEGATIVE MG/DL
HGB UR QL STRIP: NEGATIVE
KETONES UR QL STRIP.AUTO: NEGATIVE MG/DL
LEUKOCYTE ESTERASE UR QL STRIP.AUTO: ABNORMAL
N GONORRHOEA RRNA SPEC QL NAA+PROBE: NEGATIVE
NITRITE UR QL STRIP.AUTO: NEGATIVE
PH UR STRIP: 6.5 [PH] (ref 5–8)
PROT UR STRIP-MCNC: NEGATIVE MG/DL
RBC #/AREA URNS HPF: NORMAL /HPF (ref 0–5)
SP GR UR REFRACTOMETRY: 1.02 (ref 1–1.03)
SPECIMEN SOURCE: NORMAL
UROBILINOGEN UR QL STRIP.AUTO: 1 EU/DL (ref 0.2–1)
WBC URNS QL MICRO: NORMAL /HPF (ref 0–4)

## 2022-10-04 PROCEDURE — 87077 CULTURE AEROBIC IDENTIFY: CPT

## 2022-10-04 PROCEDURE — 87491 CHLMYD TRACH DNA AMP PROBE: CPT

## 2022-10-04 PROCEDURE — 96372 THER/PROPH/DIAG INJ SC/IM: CPT

## 2022-10-04 PROCEDURE — 87186 SC STD MICRODIL/AGAR DIL: CPT

## 2022-10-04 PROCEDURE — 74011000250 HC RX REV CODE- 250: Performed by: EMERGENCY MEDICINE

## 2022-10-04 PROCEDURE — 74011250636 HC RX REV CODE- 250/636: Performed by: EMERGENCY MEDICINE

## 2022-10-04 PROCEDURE — 99284 EMERGENCY DEPT VISIT MOD MDM: CPT

## 2022-10-04 PROCEDURE — 87086 URINE CULTURE/COLONY COUNT: CPT

## 2022-10-04 PROCEDURE — 81001 URINALYSIS AUTO W/SCOPE: CPT

## 2022-10-04 RX ORDER — DOXYCYCLINE HYCLATE 100 MG
100 TABLET ORAL 2 TIMES DAILY
Qty: 14 TABLET | Refills: 0 | Status: SHIPPED | OUTPATIENT
Start: 2022-10-04 | End: 2022-10-11

## 2022-10-04 RX ORDER — PHENAZOPYRIDINE HYDROCHLORIDE 200 MG/1
200 TABLET, FILM COATED ORAL 3 TIMES DAILY
Qty: 6 TABLET | Refills: 0 | Status: SHIPPED | OUTPATIENT
Start: 2022-10-04 | End: 2022-10-06

## 2022-10-04 RX ADMIN — LIDOCAINE HYDROCHLORIDE 500 MG: 10 INJECTION, SOLUTION EPIDURAL; INFILTRATION; INTRACAUDAL; PERINEURAL at 08:59
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
## 2022-10-04 NOTE — ED PROVIDER NOTES
EMERGENCY DEPARTMENT HISTORY AND PHYSICAL EXAM    8:47 AM      Date: 10/4/2022  Patient Name: Jelena Nguyen    History of Presenting Illness     Chief Complaint   Patient presents with    Urinary Pain         History Provided By: Patient  Location/Duration/Severity/Modifying factors   Patient is a 40-year-old male with a history of urinary retention, urethral damage in the remote past, recurrent urinary tract infections, straight caths for urine, the presents emergency department the complaint of several days of increasing dysuria, without fevers, chills, or flank pain. Patient also has a new sexual partner and said that started having pain when urinating and was self cathing since having intercourse. Patient is concerned he may have an STI but wanted to be checked before he discussed with his significant other. The patient denies any other aggravating alleviating factors. Patient has some pain that goes to his testicles and denies any purulence from his urethra. The patient is a smoker, occasional use of marijuana, and drinks alcohol daily. There are no other known aggravating elevating factors. 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 PCP: None    Current Facility-Administered Medications   Medication Dose Route Frequency Provider Last Rate Last Admin    cefTRIAXone (ROCEPHIN) 500 mg in lidocaine (PF) (XYLOCAINE) 10 mg/mL (1 %) IM injection  500 mg IntraMUSCular NOW Johana Alvarenga MD         Current Outpatient Medications   Medication Sig Dispense Refill    trimethoprim-sulfamethoxazole (BACTRIM DS, SEPTRA DS) 160-800 mg per tablet Take 1 Tablet by mouth two (2) times a day. 14 Tablet 0    doxycycline (VIBRAMYCIN) 100 mg capsule Take 1 Capsule by mouth two (2) times a day. 14 Capsule 0    nitrofurantoin, macrocrystal-monohydrate, (MACROBID) 100 mg capsule Take 1 Capsule by mouth two (2) times daily (with meals).  14 Capsule 0    finasteride (PROSCAR) 5 mg tablet Take 1 Tablet by mouth daily. 90 Tablet 3    bacitracin zinc (BACITRACIN) ointment APPLY TO THE AFFECTED AREA THREE TIMES DAILY (Patient not taking: Reported on 2022)      finasteride (PROSCAR) 5 mg tablet Take 5 mg by mouth daily. (Patient not taking: Reported on 2022)      tamsulosin (FLOMAX) 0.4 mg capsule  (Patient not taking: Reported on 3/14/2022)      tamsulosin (FLOMAX) 0.4 mg capsule Take 2 Capsules by mouth every evening. 180 Capsule 2    doxycycline (VIBRAMYCIN) 100 mg capsule Take 1 Cap by mouth two (2) times a day. (Patient not taking: Reported on 2022) 14 Cap 0       Past History     Past Medical History:  Past Medical History:   Diagnosis Date    MASA syndrome ([address]        Past Surgical History:  Past Surgical History:   Procedure Laterality Date    HX HERNIA REPAIR         Family History:  History reviewed. No pertinent family history. Social History:  Social History     Tobacco Use    Smoking status: Former     Packs/day: 0.25     Types: Cigarettes     Quit date: 2022     Years since quittin.4    Smokeless tobacco: Never    Tobacco comments:     quit   Vaping Use    Vaping Use: Never used   Substance Use Topics    Alcohol use: Not Currently     Alcohol/week: 7.0 standard drinks     Types: 4 Glasses of wine, 3 Cans of beer per week    Drug use: Yes     Types: Marijuana       Allergies: Allergies   Allergen Reactions    Penicillins Other (comments)         Review of Systems       Review of Systems   Constitutional:  Negative for activity change, fatigue and fever. HENT:  Negative for congestion and rhinorrhea. Eyes:  Negative for visual disturbance. Respiratory:  Negative for shortness of breath. Cardiovascular:  Negative for chest pain and palpitations. Gastrointestinal:  Negative for abdominal pain, diarrhea, nausea and vomiting. Genitourinary:  Positive for difficulty urinating, dysuria and testicular pain. Negative for hematuria.    Musculoskeletal:  Negative for back pain. Skin:  Negative for rash. Neurological:  Negative for dizziness, weakness and light-headedness. All other systems reviewed and are negative. Physical Exam   Visit Vitals  /89   Pulse 94   Temp 97.9 °F (36.6 °C)   Resp 17   Ht 5' 9\" (1.753 m)   Wt 74.8 kg (165 lb)   SpO2 100%   BMI 24.37 kg/m²         Physical Exam  Vitals and nursing note reviewed. Constitutional:       General: He is not in acute distress. Appearance: He is well-developed. HENT:      Head: Normocephalic and atraumatic. Right Ear: External ear normal.      Left Ear: External ear normal.      Nose: Nose normal.   Eyes:      General: No scleral icterus. Conjunctiva/sclera: Conjunctivae normal.      Pupils: Pupils are equal, round, and reactive to light. Neck:      Thyroid: No thyromegaly. Vascular: No JVD. Trachea: No tracheal deviation. Cardiovascular:      Rate and Rhythm: Normal rate and regular rhythm. Heart sounds: Normal heart sounds. No murmur heard. No friction rub. No gallop. Pulmonary:      Effort: Pulmonary effort is normal.      Breath sounds: Normal breath sounds. Chest:      Chest wall: No tenderness. Abdominal:      General: Bowel sounds are normal. There is no distension. Palpations: Abdomen is soft. Tenderness: There is no abdominal tenderness. There is no guarding or rebound. Genitourinary:     Comments: No urethral drainage, no femoral adenopathy, no testicular swelling, cremasterics intact  Musculoskeletal:         General: No tenderness. Normal range of motion. Cervical back: Normal range of motion and neck supple. Lymphadenopathy:      Cervical: No cervical adenopathy. Skin:     General: Skin is warm and dry. Neurological:      Mental Status: He is alert and oriented to person, place, and time. Cranial Nerves: No cranial nerve deficit.       Coordination: Coordination normal.      Comments: No sensory loss, Gait normal, Motor 5/5 Psychiatric:         Behavior: Behavior normal.         Thought Content: Thought content normal.         Judgment: Judgment normal.      Comments: Good insight to his care         Diagnostic Study Results     Labs -  Recent Results (from the past 12 hour(s))   URINALYSIS W/ RFLX MICROSCOPIC    Collection Time: 10/04/22  8:29 AM   Result Value Ref Range    Color YELLOW      Appearance CLEAR      Specific gravity 1.020 1.005 - 1.030      pH (UA) 6.5 5.0 - 8.0      Protein Negative NEG mg/dL    Glucose Negative NEG mg/dL    Ketone Negative NEG mg/dL    Bilirubin Negative NEG      Blood Negative NEG      Urobilinogen 1.0 0.2 - 1.0 EU/dL    Nitrites Negative NEG      Leukocyte Esterase TRACE (A) NEG         Radiologic Studies -   No orders to display         Medical Decision Making   I am the first provider for this patient. I reviewed the vital signs, available nursing notes, past medical history, past surgical history, family history and social history. Vital Signs-Reviewed the patient's vital signs. Records Reviewed: Nursing Notes, Old Medical Records, Previous Radiology Studies, and Previous Laboratory Studies (Time of Review: 8:47 AM)    ED Course: Progress Notes, Reevaluation, and Consults: There is no evidence of trichomonas on his urine and has pyuria suggestive of acute UTI. Will initiate doxycycline as it is been effective for his UTIs in the past and will cover chlamydia and have him follow closely as an outpatient and return if at all worsened or concerned. Workup and recommendations were reviewed with the patient and all questions were answered. The patient understands the plan and will proceed with close outpatient care. I have encouraged the patient to return if at all worsened or concerned.    Srikanth Sykes, DO 9:05 AM      Provider Notes (Medical Decision Making):   MDM  Number of Diagnoses or Management Options  Acute UTI  Dysuria  Diagnosis management comments: Patient is a 61-year-old male with a history of urinary retention now self catheterizes himself with recurrent urinary tract infections most recently with staff epidermidis treated with doxycycline presents emergency department with urethral pain and dysuria when he tries urinate or when he self catheterizes. Patient's exam is reassuring is nontoxic-appearing and he has increasing concern for STI. We will treat the patient with Rocephin and says he does not know of any reaction had a penicillin just his mom said he had when he was a child, plan to discharge patient on doxycycline that was most recently his medication that was on for staph epidermidis, and have patient follow closely with urology given his complicated urologic history. Procedures        Diagnosis     Clinical Impression:   1. Acute UTI    2. Dysuria        Disposition: DC    Follow-up Information    None          Patient's Medications   Start Taking    No medications on file   Continue Taking    BACITRACIN ZINC (BACITRACIN) OINTMENT    APPLY TO THE AFFECTED AREA THREE TIMES DAILY    DOXYCYCLINE (VIBRAMYCIN) 100 MG CAPSULE    Take 1 Cap by mouth two (2) times a day. DOXYCYCLINE (VIBRAMYCIN) 100 MG CAPSULE    Take 1 Capsule by mouth two (2) times a day. FINASTERIDE (PROSCAR) 5 MG TABLET    Take 5 mg by mouth daily. FINASTERIDE (PROSCAR) 5 MG TABLET    Take 1 Tablet by mouth daily. NITROFURANTOIN, MACROCRYSTAL-MONOHYDRATE, (MACROBID) 100 MG CAPSULE    Take 1 Capsule by mouth two (2) times daily (with meals). TAMSULOSIN (FLOMAX) 0.4 MG CAPSULE        TAMSULOSIN (FLOMAX) 0.4 MG CAPSULE    Take 2 Capsules by mouth every evening. TRIMETHOPRIM-SULFAMETHOXAZOLE (BACTRIM DS, SEPTRA DS) 160-800 MG PER TABLET    Take 1 Tablet by mouth two (2) times a day. These Medications have changed    No medications on file   Stop Taking    No medications on file     Disclaimer: Sections of this note are dictated using utilizing voice recognition software. Minor typographical errors may be present. If questions arise, please do not hesitate to contact me or call our department.

## 2022-10-04 NOTE — DISCHARGE INSTRUCTIONS
Make sure to take your antibiotics and follow the results of your gonorrhea and Chlamydia testing. Take your antibiotics for the next week and following with your urologist as there is antibiotics sometimes need to be changed. Any questions please call us at [phone].

## 2022-10-07 LAB
BACTERIA SPEC CULT: ABNORMAL
CC UR VC: ABNORMAL
SERVICE CMNT-IMP: ABNORMAL

## 2022-11-04 ENCOUNTER — HOSPITAL ENCOUNTER (EMERGENCY)
Age: 57
Discharge: HOME | End: 2022-11-04
Attending: EMERGENCY MEDICINE
Payer: COMMERCIAL

## 2022-11-04 VITALS
TEMPERATURE: 97.5 F | OXYGEN SATURATION: 99 % | DIASTOLIC BLOOD PRESSURE: 114 MMHG | RESPIRATION RATE: 16 BRPM | SYSTOLIC BLOOD PRESSURE: 160 MMHG | HEART RATE: 83 BPM

## 2022-11-04 DIAGNOSIS — Z20.2 EXPOSURE TO CHLAMYDIA: Primary | ICD-10-CM

## 2022-11-04 DIAGNOSIS — Z78.9 SELF-CATHETERIZES URINARY BLADDER: ICD-10-CM

## 2022-11-04 DIAGNOSIS — R36.9 PENILE DISCHARGE: ICD-10-CM

## 2022-11-04 LAB
APPEARANCE UR: CLEAR
BACTERIA URNS QL MICRO: NEGATIVE /HPF
BILIRUB UR QL: NEGATIVE
C TRACH RRNA SPEC QL NAA+PROBE: NEGATIVE
COLOR UR: YELLOW
EPITH CASTS URNS QL MICRO: NORMAL /LPF (ref 0–5)
GLUCOSE UR STRIP.AUTO-MCNC: NEGATIVE MG/DL
HGB UR QL STRIP: NEGATIVE
KETONES UR QL STRIP.AUTO: NEGATIVE MG/DL
LEUKOCYTE ESTERASE UR QL STRIP.AUTO: ABNORMAL
N GONORRHOEA RRNA SPEC QL NAA+PROBE: NEGATIVE
NITRITE UR QL STRIP.AUTO: NEGATIVE
PH UR STRIP: 6.5 [PH] (ref 5–8)
PROT UR STRIP-MCNC: NEGATIVE MG/DL
RBC #/AREA URNS HPF: NEGATIVE /HPF (ref 0–5)
SP GR UR REFRACTOMETRY: 1.02 (ref 1–1.03)
SPECIMEN SOURCE: NORMAL
UROBILINOGEN UR QL STRIP.AUTO: 0.2 EU/DL (ref 0.2–1)
WBC URNS QL MICRO: NORMAL /HPF (ref 0–4)

## 2022-11-04 PROCEDURE — 99284 EMERGENCY DEPT VISIT MOD MDM: CPT

## 2022-11-04 PROCEDURE — 87086 URINE CULTURE/COLONY COUNT: CPT

## 2022-11-04 PROCEDURE — 74011000250 HC RX REV CODE- 250: Performed by: PHYSICIAN ASSISTANT

## 2022-11-04 PROCEDURE — 87491 CHLMYD TRACH DNA AMP PROBE: CPT

## 2022-11-04 PROCEDURE — 74011250636 HC RX REV CODE- 250/636: Performed by: PHYSICIAN ASSISTANT

## 2022-11-04 PROCEDURE — 81001 URINALYSIS AUTO W/SCOPE: CPT

## 2022-11-04 PROCEDURE — 96372 THER/PROPH/DIAG INJ SC/IM: CPT

## 2022-11-04 RX ORDER — DOXYCYCLINE HYCLATE 100 MG
100 TABLET ORAL 2 TIMES DAILY
Qty: 14 TABLET | Refills: 0 | Status: SHIPPED | OUTPATIENT
Start: 2022-11-04 | End: 2022-11-11

## 2022-11-04 RX ADMIN — LIDOCAINE HYDROCHLORIDE 500 MG: 10 INJECTION, SOLUTION EPIDURAL; INFILTRATION; INTRACAUDAL; PERINEURAL at 11:45

## 2022-11-04 NOTE — ED TRIAGE NOTES
Pt complains of burning with urination and penile discharge. Pt does self cath because he had a recent surgery for his bladder at [facility], but wants to get checked for STDs because he states his \"girlfriend may have been sleeping around\". Pt is A/O x 4.

## 2022-11-04 NOTE — DISCHARGE INSTRUCTIONS
SPECIFIC PATIENT INSTRUCTIONS FROM THE PROVIDER WHO TREATED YOU IN THE ER TODAY:    You were treated in for the exposure to a possible STD. No sexual activity for the next 2 weeks. Encourage your partner(s) to get evaluated and treated. DO NOT resume sexual activity until you and your partner(s) are treated and are symptoms free. Finish antibiotics as directed. condition(s)    Note that it takes 48-72 hours to process some of the tests done today. You can obtain results by accessing [facility] online or contacting ER for results. You also can obtain your results and treatment through the local health department and STD Clinic. FAILURE TO FOLLOW UP MAY RESULT IN WORSENING OF YOUR CONDITION INCLUDING SPREAD OF INFECTION, DISABILITY, INFERTILITY OR EVEN DEATH!!!     Follow up with your primary care provider or the provided referral for further evaluation and management  Return to emergency room at once for worsening or new symptoms. You have elevated blood pressure readings here. You MUST be further evaluated by a your primary care provider within 2-4 days as you may need to develop or change the way to keep your blood pressure under control.       Note that your today's urine test reveals very minimal abnormality as noted below (urine culture and STD screening pending):    Labs Reviewed   URINALYSIS W/ RFLX MICROSCOPIC - Abnormal; Notable for the following components:       Result Value    Leukocyte Esterase SMALL (*)     All other components within normal limits   CULTURE, URINE   URINE MICROSCOPIC ONLY   CHLAMYDIA/NEISSERIA AMPLIFICATION      Per your request, below is your previous STD lab from 10/4/2022:  CHLAMYDIA/NEISSERIA AMPLIFICATION  Order: [id number]  Collected 10/4/2022 08:29    Status: Final result    Next appt: None    0 Result Notes  Component Ref Range & Units 10/4/22 0829 4/19/18 1250 2/11/18 1012   Chlamydia amplification NEG   Negative [1]  NEGATIVE  NEGATIVE    N. gonorrhoeae amplification NEG   Negative [1]  NEGATIVE  NEGATIVE    Comment: Testing performed by nucleic acid amplification method.    Specimen Source   URINE [2]  URINE  URINE    Resulting Agency  [address] at [address]              Specimen Collected: 10/04/22 08:29 Last Resulted: 10/04/22 16:20

## 2022-11-04 NOTE — ED PROVIDER NOTES
[address] EMERGENCY DEPT      Sherrill Loo is a 59 y.o. male with noted past medical history who presents to the emergency department complains of burning with urination and penile discharge. Pt does self cath because he had a recent surgery for his bladder cancer and polyps at [facility]. He wants to get checked for STDs because he states his GF may have chlamydia. Patient desires empirical treatment. Prior records show a similar visit last month with empirical treatment for STDs. Denies fevers, chills, nausea, vomiting, back/flank pain, abdominal pain, rashes, swollen lymph nodes. No other complaints.      Past Medical History:   Diagnosis Date    MASA syndrome (HCC)         Allergies   Allergen Reactions    Penicillins Other (comments)       Social History     Socioeconomic History    Marital status: SINGLE     Spouse name: Not on file    Number of children: Not on file    Years of education: Not on file    Highest education level: Not on file   Occupational History    Not on file   Tobacco Use    Smoking status: Former     Packs/day: 0.25     Types: Cigarettes     Quit date: 2022     Years since quittin.5    Smokeless tobacco: Never    Tobacco comments:     quit   Vaping Use    Vaping Use: Never used   Substance and Sexual Activity    Alcohol use: Not Currently     Alcohol/week: 7.0 standard drinks     Types: 4 Glasses of wine, 3 Cans of beer per week    Drug use: Yes     Types: Marijuana    Sexual activity: Yes     Partners: Female   Other Topics Concern    Not on file   Social History Narrative    Not on file     Social Determinants of Health     Financial Resource Strain: Not on file   Food Insecurity: Not on file   Transportation Needs: Not on file   Physical Activity: Not on file   Stress: Not on file   Social Connections: Not on file   Intimate Partner Violence: Not on file   Housing Stability: Not on file        REVIEW OF SYSTEMS:    Constitutional:  Negative for fever, chills, diaphoresis, change in weight. HENT:  Negative. Respiratory:  Negative for cough and shortness of breath. Cardiovascular:  Negative for chest pain and palpitations. Gastrointestinal:  Negative for abd pain, nausea, diarrhea. Genitourinary:  Negative for dysuria, difficulty urination, blood in urine, penile/scrotal/testicular pain/swelling/lumps, flank pain, genital rash. Musculoskeletal:  Negative for back pain. Skin:  Negative for rashes or pallor. Neurological:  Negative for weakness. All other systems are negative    Visit Vitals  BP (!) 160/114   Pulse 83   Temp 97.5 °F (36.4 °C)   Resp 16   SpO2 99%       PHYSICAL EXAM:    Vital signs and Nursing notes reviewed    CONSTITUTIONAL:  Alert, in no apparent distress;  well developed;  well nourished. HEAD:  Normocephalic, atraumatic. EYES:  EOMI. Non-icteric sclera. Normal conjunctiva. ENTM:  mucous membranes moist.  NECK:  Supple, FROM, w/o lymphadenopathy  RESPIRATORY:  Chest clear, equal breath sounds, good air movement. CARDIOVASCULAR:  Regular rate and rhythm. No murmurs, rubs, or gallops. GI:  Normal bowel sounds, abdomen soft and non-tender. No rebound or guarding. No palpable masses or organomegaly. No CVA.  exam: Declined. BACK:  Non-tender. MS/EXT:  Normal inspection. NEURO:  Moves all four extremities, and grossly normal motor exam.  SKIN:  No rashes;  Normal for age. PSYCH:  Alert and normal affect.     DDx: STD, UTI, priapism, epididymitis, prostatitis, orchitis, scrotal abscess, inguinal hernia, testicular infarction, testicular torsion, Cyn's gangrene, lymphadenopathy, malignancy     Abnormal lab results from this emergency department encounter:  Labs Reviewed   URINALYSIS W/ RFLX MICROSCOPIC - Abnormal; Notable for the following components:       Result Value    Leukocyte Esterase SMALL (*)     All other components within normal limits   CULTURE, URINE   CHLAMYDIA/NEISSERIA AMPLIFICATION   URINE MICROSCOPIC ONLY       Lab values for this patient within approximately the last 12 hours:  Recent Results (from the past 12 hour(s))   URINALYSIS W/ RFLX MICROSCOPIC    Collection Time: 11/04/22 11:30 AM   Result Value Ref Range    Color YELLOW      Appearance CLEAR      Specific gravity 1.019 1.005 - 1.030      pH (UA) 6.5 5.0 - 8.0      Protein Negative NEG mg/dL    Glucose Negative NEG mg/dL    Ketone Negative NEG mg/dL    Bilirubin Negative NEG      Blood Negative NEG      Urobilinogen 0.2 0.2 - 1.0 EU/dL    Nitrites Negative NEG      Leukocyte Esterase SMALL (A) NEG     CHLAMYDIA/NEISSERIA AMPLIFICATION    Collection Time: 11/04/22 11:30 AM   Result Value Ref Range    Chlamydia amplification Negative NEG      N. gonorrhoeae amplification Negative NEG      Specimen Source URINE, KEELY    URINE MICROSCOPIC ONLY    Collection Time: 11/04/22 11:30 AM   Result Value Ref Range    WBC 4 to 10 0 - 4 /hpf    RBC Negative 0 - 5 /hpf    Epithelial cells FEW 0 - 5 /lpf    Bacteria Negative NEG /hpf       Radiologist and cardiologist interpretations if available at time of this note:  No orders to display       Medication(s) ordered for patient during this emergency visit encounter:  Medications   cefTRIAXone (ROCEPHIN) 500 mg in lidocaine (PF) (XYLOCAINE) 10 mg/mL (1 %) IM injection (500 mg IntraMUSCular Given 11/4/22 1145)       ED COURSE:  Urine or urethral specimen(s) may have been collected to check for gonorrhea and chlamydia. IMPRESSION AND MEDICAL DECISION MAKING:  Based upon the patients presentation with noted HPI and PE, along with the work up done in the emergency department, I believe that the patient is had an STD exposure and will be treated with Rocephin injection and prescription for doxycycline. Had a lengthy conversation with the patient about frequent STD treatments and risk of resistance to ABX. Noted that his prior records show UTIs with multiple resistance to ABX.  Encourage patient to use protection with intercourse. Patient will follow-up with his urologist, will follow STD screening results. Patient requested all his results to be given to him including last visit on 10/4/2022. Patient left without his discharge papers. Discussed results, care in ED and further care, f/u and s/s warranting return to ED. Pt understood and agreed to plan. Follow-up Activity limitations:  None  Condition on Discharge:  Stable     Diagnosis:   1. Exposure to chlamydia    2. Penile discharge    3. Self-catheterizes urinary bladder          Disposition: home    Follow-up Information       Follow up With Specialties Details [facility] EMERGENCY DEPT Emergency Medicine  As needed, If symptoms worsen [id number] [facility]  [phone]    [facility] urologist  In 5 days for recheck of current symptoms. follow up sooner as needed             Discharge Medication List as of 11/4/2022 12:19 PM        START taking these medications    Details   doxycycline (VIBRA-TABS) 100 mg tablet Take 1 Tablet by mouth two (2) times a day for 7 days. , Normal, Disp-14 Tablet, R-0           CONTINUE these medications which have NOT CHANGED    Details   finasteride (PROSCAR) 5 mg tablet Take 1 Tablet by mouth daily. , Normal, Disp-90 Tablet, R-3      tamsulosin (FLOMAX) 0.4 mg capsule Take 2 Capsules by mouth every evening., Normal, Disp-180 Capsule, R-2              Dictation disclaimer:  Please note that this dictation was completed with [email or web address], the [employer] voice recognition software. Quite often unanticipated grammatical, syntax, homophones, and other interpretive errors are inadvertently transcribed by the computer software. Please disregard these errors. Please excuse any errors that have escaped final proofreading.

## 2022-11-06 LAB
BACTERIA SPEC CULT: NORMAL
SERVICE CMNT-IMP: NORMAL

## 2022-11-29 ENCOUNTER — HOSPITAL ENCOUNTER (EMERGENCY)
Age: 57
Discharge: LEFT BEFORE DISCHARGE | End: 2022-11-29
Attending: STUDENT IN AN ORGANIZED HEALTH CARE EDUCATION/TRAINING PROGRAM | Admitting: STUDENT IN AN ORGANIZED HEALTH CARE EDUCATION/TRAINING PROGRAM
Payer: COMMERCIAL

## 2022-11-29 VITALS
HEART RATE: 92 BPM | RESPIRATION RATE: 18 BRPM | WEIGHT: 164 LBS | BODY MASS INDEX: 23.48 KG/M2 | OXYGEN SATURATION: 100 % | DIASTOLIC BLOOD PRESSURE: 105 MMHG | TEMPERATURE: 98.9 F | SYSTOLIC BLOOD PRESSURE: 176 MMHG | HEIGHT: 70 IN

## 2022-11-29 DIAGNOSIS — N30.00 ACUTE CYSTITIS WITHOUT HEMATURIA: Primary | ICD-10-CM

## 2022-11-29 PROCEDURE — 75810000275 HC EMERGENCY DEPT VISIT NO LEVEL OF CARE: Performed by: STUDENT IN AN ORGANIZED HEALTH CARE EDUCATION/TRAINING PROGRAM

## 2022-11-30 NOTE — ED TRIAGE NOTES
A&O male with c/o urinary pain, body aches, cough, and congestion. States testing positive for Covid at home. Evaluated at [facility] yesterday for UTI but states he was not prescribed any medications. Reports episodes of vomiting last night and this morning.

## 2022-11-30 NOTE — ED PROVIDER NOTES
Patient presented for symptoms of UTI. Patient was seen at outside facility yesterday for the same complaint. I did not personally see or examine this patient as he left without being seen after triage.

## 2024-06-17 ENCOUNTER — HOSPITAL ENCOUNTER (EMERGENCY)
Facility: HOSPITAL | Age: 59
Discharge: HOME | End: 2024-06-17
Attending: STUDENT IN AN ORGANIZED HEALTH CARE EDUCATION/TRAINING PROGRAM
Payer: COMMERCIAL

## 2024-06-17 VITALS
RESPIRATION RATE: 18 BRPM | OXYGEN SATURATION: 96 % | TEMPERATURE: 98.9 F | WEIGHT: 168 LBS | SYSTOLIC BLOOD PRESSURE: 127 MMHG | HEART RATE: 109 BPM | HEIGHT: 69 IN | BODY MASS INDEX: 24.88 KG/M2 | DIASTOLIC BLOOD PRESSURE: 83 MMHG

## 2024-06-17 DIAGNOSIS — R10.9 FLANK PAIN: Primary | ICD-10-CM

## 2024-06-17 LAB
APPEARANCE UR: NORMAL
BILIRUB UR QL: NEGATIVE
COLOR UR: YELLOW
GLUCOSE UR STRIP.AUTO-MCNC: NEGATIVE MG/DL
HGB UR QL STRIP: NEGATIVE
KETONES UR QL STRIP.AUTO: NEGATIVE MG/DL
LEUKOCYTE ESTERASE UR QL STRIP.AUTO: NEGATIVE
NITRITE UR QL STRIP.AUTO: NEGATIVE
PH UR STRIP: 6.5 (ref 5–8)
PROT UR STRIP-MCNC: NEGATIVE MG/DL
SP GR UR REFRACTOMETRY: 1.02 (ref 1–1.03)
UROBILINOGEN UR QL STRIP.AUTO: 1 EU/DL (ref 0.2–1)

## 2024-06-17 PROCEDURE — 99283 EMERGENCY DEPT VISIT LOW MDM: CPT

## 2024-06-17 PROCEDURE — 81003 URINALYSIS AUTO W/O SCOPE: CPT

## 2024-06-17 ASSESSMENT — PAIN - FUNCTIONAL ASSESSMENT
PAIN_FUNCTIONAL_ASSESSMENT: 0-10
PAIN_FUNCTIONAL_ASSESSMENT: ACTIVITIES ARE NOT PREVENTED

## 2024-06-17 ASSESSMENT — PAIN DESCRIPTION - ORIENTATION: ORIENTATION: RIGHT;LEFT

## 2024-06-17 ASSESSMENT — PAIN DESCRIPTION - FREQUENCY: FREQUENCY: CONTINUOUS

## 2024-06-17 ASSESSMENT — LIFESTYLE VARIABLES
HOW MANY STANDARD DRINKS CONTAINING ALCOHOL DO YOU HAVE ON A TYPICAL DAY: PATIENT DOES NOT DRINK
HOW OFTEN DO YOU HAVE A DRINK CONTAINING ALCOHOL: NEVER

## 2024-06-17 ASSESSMENT — PAIN DESCRIPTION - LOCATION: LOCATION: FLANK

## 2024-06-17 ASSESSMENT — PAIN SCALES - GENERAL: PAINLEVEL_OUTOF10: 4

## 2024-06-17 ASSESSMENT — PAIN DESCRIPTION - PAIN TYPE: TYPE: ACUTE PAIN

## 2024-06-17 ASSESSMENT — PAIN DESCRIPTION - ONSET: ONSET: AWAKENED FROM SLEEP

## 2024-06-17 ASSESSMENT — PAIN DESCRIPTION - DESCRIPTORS: DESCRIPTORS: ACHING

## 2024-06-18 NOTE — ED NOTES
Approximately this time, this RN was retrieving a patient from waiting room, Mr. Ahumada was seen leaving the ER and verbalized something about our care and his insurance. Unable to determine nature of his complaint but he was not willing to continue care in this ER.

## 2024-06-18 NOTE — ED TRIAGE NOTES
Pt c/o urinary retention and flank pain since \"couple\" weeks.    Pt stated \"that he has to self cath himself for urinary problems and think he gave himself a UTI\".    Pt has a Hx of Urinary Problems.   Past Medical History:   Diagnosis Date    MASA syndrome (HCC)        Pt ambulated to the room with a steady gait.

## 2024-06-18 NOTE — ED PROVIDER NOTES
attending Dr. Pepe Alberts        Diagnosis and Disposition     CLINICAL IMPRESSION:  No diagnosis found.     Medication List        ASK your doctor about these medications      finasteride 5 MG tablet  Commonly known as: PROSCAR     tamsulosin 0.4 MG capsule  Commonly known as: FLOMAX              Disposition: Home    Patient condition at time of disposition: Stable    DISCHARGE NOTE:   Pt has been reexamined. Patient has no new complaints, changes, or physical findings.  Care plan outlined and precautions discussed.  Results were reviewed with the patient. All medications were reviewed with the patient. All of pt's questions and concerns were addressed.  Alarm symptoms and return precautions associated with chief complaint and evaluation were reviewed with the patient in detail.  The patient demonstrated adequate understanding.  Patient was instructed to follow up with PCP, as well as given strict return precautions to the ED upon further deterioration. Patient is ready for discharge home.          Dragon Disclaimer     Please note that this dictation was completed with [employer], the computer voice recognition software.  Quite often unanticipated grammatical, syntax, homophones, and other interpretive errors are inadvertently transcribed by the computer software.  Please disregard these errors.  Please excuse any errors that have escaped final proofreading.      Kelley Acuna MD, PGY-2  [facility]     Kelley Acuna MD  Resident  06/17/24 [id number]